# Patient Record
(demographics unavailable — no encounter records)

---

## 2025-03-24 NOTE — ASSESSMENT
[FreeTextEntry1] : Interim history The patient returns with pain that has been treated adequately in the past with epidural steroid injections.  The patient's complaints are consistent with radiculopathy. Patient's ADL's increase with prior STEVO.   The last injection gave greater than 60% reduction of the pain but now there is a return of symptoms. The patient is requesting a subsequent epidural steroid injection to alleviate pain and improve functional ability and quality of life.  Patient is a candidate. Objective findings Since the last visit, there have been no new imaging studies. The patient has no new symptoms except the return of the their pain complaints. Motor and sensory function is unchanged. Plan Spoke to patient about epidural steroid injections. Explained risks, benefits and alternatives including but not limited to the risk of infection, bleeding, headache, syncopal episode, failure to resolve issues, allergic reaction, symptom recurrence, allergic reaction, nerve injury, and increased pain. The patient understands the risks. All questions were answered. The patient is willing to proceed.  This note was generated by using Dragon medical dictation software.  A reasonable effort has been made for proofreading its contents, but typos may still remain.  If there are any questions or points of clarification needed, please notify my office.

## 2025-03-24 NOTE — HISTORY OF PRESENT ILLNESS
[FreeTextEntry1] : Mr. RICKIE BAUTISTA IS FOLLOWING UP FOR  RETURNING PAIN OF LSPINE RADIATING LT LEG . PT STATES LAST STEVO 2024  DECREASED PAIN AND INCREASED ADL.   DURATION OF RELIEF: 6mnth   PAIN LEVEL BEFORE STEVO: 6-10/10  PAIN LEVEL AFTER STEVO:0/10  CURRENT PAIN LEVEL: 6-10/10     DATE OF INJURY/ONSET  ON GOING ISSUE THAT START 3 YRS AGO AND BEEN PREOGRESSING TO [facility]           MECHANISM OF INJURY: UNKOWN INJURAY        QUALITY OF SYMPTOMS:  DULL/ACHE,      IMPROVES WITH:  ACETPH      WORSE WITH:  STANDING, SITTING,     PRIOR TREATMENT:           PRIOR IMAGIN2024       SCHOOL/SPORT/POSITION/OCCUPATION:       ADDITIONAL INFORMATION:   KNEE REPLACEMT SX  RT HIP REPLACEMENT SX

## 2025-05-19 NOTE — REASON FOR VISIT
[Follow-Up Visit] : a follow-up visit for [Cervical Myelopathy/Myopathy] : cervical myelopathy/myopathy [Back Pain] : back pain

## 2025-05-21 NOTE — DISCUSSION/SUMMARY
[Medication Risks Reviewed] : Medication risks reviewed [Surgical risks reviewed] : Surgical risks reviewed [2 Weeks] : in 2 weeks [de-identified] : Prescription for MRI of the cervical spine and lumbar spine to evaluate cervical myelopathy and lumbar stenosis as the patient complains of increasing difficulty with ambulation. Patient is to return to review the MRI of both the lumbar and cervical spine and formulate a plan of care. Patient can return to Dr. Correa's after the hide of the lumbar spine to reevaluate areas of injection to be provided for some relief of pain of the lower back.  I, Dr. Chalo Roach, personally performed the evaluation and management (E/M) services for this new patient.  That E/M includes conducting the initial examination, assessing all conditions, and establishing a plan of care.  Today, my ACP, Olimpia Alfonso, was here to observe my evaluation and management services for this patient to be followed going forward.

## 2025-05-21 NOTE — PHYSICAL EXAM
[Cane] : ambulates with cane [Normal] : Oriented to person, place, and time, insight and judgement were intact and the affect was normal [Ataxic] : ataxic [Wide-Based] : wide-based [NL] : Motor strength of the right lower extremity was normal [Motor Strength Lower Extremities] : left (weak) [] : Sensory: [L1-LT] : L1 [L2-LT] : L2 [L3-LT] : L3 [L4-LT] : L4 [L5-LT] : L5 [S1-LT] : S1 [de-identified] : 2 views of the cervical spine AP noted shows no shifting of the hardware, good alignment. Lateral view with increased degenerative changes to the adjacent level above the fusion and multilevel degenerative changes to the lower half of the cervical spine when compared to last years xrays.  4 views of the lumbar spine AP with good alignment. Lateral view with increased degenerative changes at the level of L1-2 is worse, also seen at L2-3.  Large endplate vertebral osteophytes. Lateral views of flexion and extension with no instability. No obvious fractures, no bony tumors.

## 2025-05-21 NOTE — HISTORY OF PRESENT ILLNESS
[Pain Location] : pain [C-Spine] : C-spine region [Lumbar] : lumbar region [Worsening] : worsening [5] : a current pain level of 5/10 [Walking] : worsened by walking [de-identified] : Follow-up for cervical myelopathy status post cervical fusion. Patient with continued difficulty with ambulating secondary to myelopathy and is currently utilizing a cane to ambulate. Patient had received STEVO last June 2024 with significant relief of his symptoms that lasted for 6 months. Patient returned to Dr Adhikari recently and received an injection this year that provided relief on the left side, but the right side continued in pain. [de-identified] : jessica

## 2025-05-21 NOTE — DISCUSSION/SUMMARY
[Medication Risks Reviewed] : Medication risks reviewed [Surgical risks reviewed] : Surgical risks reviewed [2 Weeks] : in 2 weeks [de-identified] : Prescription for MRI of the cervical spine and lumbar spine to evaluate cervical myelopathy and lumbar stenosis as the patient complains of increasing difficulty with ambulation. Patient is to return to review the MRI of both the lumbar and cervical spine and formulate a plan of care. Patient can return to Dr. Correa's after the hide of the lumbar spine to reevaluate areas of injection to be provided for some relief of pain of the lower back.  I, Dr. Chalo Roach, personally performed the evaluation and management (E/M) services for this new patient.  That E/M includes conducting the initial examination, assessing all conditions, and establishing a plan of care.  Today, my ACP, Olimpia Alfonso, was here to observe my evaluation and management services for this patient to be followed going forward.

## 2025-05-21 NOTE — HISTORY OF PRESENT ILLNESS
[Pain Location] : pain [C-Spine] : C-spine region [Lumbar] : lumbar region [Worsening] : worsening [5] : a current pain level of 5/10 [Walking] : worsened by walking [de-identified] : Follow-up for cervical myelopathy status post cervical fusion. Patient with continued difficulty with ambulating secondary to myelopathy and is currently utilizing a cane to ambulate. Patient had received STEVO last June 2024 with significant relief of his symptoms that lasted for 6 months. Patient returned to Dr Adhikari recently and received an injection this year that provided relief on the left side, but the right side continued in pain. [de-identified] : jessica

## 2025-05-21 NOTE — PHYSICAL EXAM
[Cane] : ambulates with cane [Normal] : Oriented to person, place, and time, insight and judgement were intact and the affect was normal [Ataxic] : ataxic [Wide-Based] : wide-based [NL] : Motor strength of the right lower extremity was normal [Motor Strength Lower Extremities] : left (weak) [] : Sensory: [L1-LT] : L1 [L2-LT] : L2 [L3-LT] : L3 [L4-LT] : L4 [L5-LT] : L5 [S1-LT] : S1 [de-identified] : 2 views of the cervical spine AP noted shows no shifting of the hardware, good alignment. Lateral view with increased degenerative changes to the adjacent level above the fusion and multilevel degenerative changes to the lower half of the cervical spine when compared to last years xrays.  4 views of the lumbar spine AP with good alignment. Lateral view with increased degenerative changes at the level of L1-2 is worse, also seen at L2-3.  Large endplate vertebral osteophytes. Lateral views of flexion and extension with no instability. No obvious fractures, no bony tumors.

## 2025-07-15 NOTE — PHYSICAL EXAM
[Ataxic] : ataxic [Wide-Based] : wide-based [Cane] : ambulates with cane [NL] : Motor strength of the right lower extremity was normal [Motor Strength Lower Extremities] : left (weak) [] : Sensory: [L1-LT] : L1 [L2-LT] : L2 [L3-LT] : L3 [L4-LT] : L4 [L5-LT] : L5 [S1-LT] : S1 [Normal] : Oriented to person, place, and time, insight and judgement were intact and the affect was normal [de-identified] : See H & P

## 2025-07-15 NOTE — HISTORY OF PRESENT ILLNESS
[de-identified] : MRI of the C and L spines reviewed with the patient.  No significant changes in pathology.  No changes in physical exam.  Recommend considering an STEVO at L 3-4.  Patient in agreement and will follow up as needed with me.

## 2025-07-16 NOTE — HISTORY OF PRESENT ILLNESS
[Lower back] : lower back [Left Leg] : left leg [5] : 5 [Dull/Aching] : dull/aching [Tingling] : tingling [] : yes [Constant] : constant [Household chores] : household chores [Bending forward] : bending forward [Home] : at home, [unfilled] , at the time of the visit. [Medical Office: ([facility])___] : at the medical office located in  [Telehealth (audio & video)] : This visit was provided via telehealth using real-time 2-way audio visual technology. [Verbal consent obtained from patient] : the patient, [unfilled] [FreeTextEntry7] : lt leg